# Patient Record
Sex: FEMALE | Race: WHITE | NOT HISPANIC OR LATINO | Employment: FULL TIME | URBAN - METROPOLITAN AREA
[De-identification: names, ages, dates, MRNs, and addresses within clinical notes are randomized per-mention and may not be internally consistent; named-entity substitution may affect disease eponyms.]

---

## 2023-06-05 LAB
ERYTHROCYTE DISTRIBUTION WIDTH (RATIO) BY AUTOMATED COUNT: 19.9 % (ref 11.5–14.5)
ERYTHROCYTE MEAN CORPUSCULAR HEMOGLOBIN CONCENTRATION (G/DL) BY AUTOMATED: 25.7 G/DL (ref 32–36)
ERYTHROCYTE MEAN CORPUSCULAR VOLUME (FL) BY AUTOMATED COUNT: 66 FL (ref 80–100)
ERYTHROCYTES (10*6/UL) IN BLOOD BY AUTOMATED COUNT: 4.42 X10E12/L (ref 4–5.2)
ESTIMATED AVERAGE GLUCOSE FOR HBA1C: 111 MG/DL
FERRITIN, PREGNANCY: 9 UG/L
HEMATOCRIT (%) IN BLOOD BY AUTOMATED COUNT: 29.2 % (ref 36–46)
HEMOGLOBIN (G/DL) IN BLOOD: 7.5 G/DL (ref 12–16)
HEMOGLOBIN A1C/HEMOGLOBIN TOTAL IN BLOOD: 5.5 %
IRON (UG/DL) IN SER/PLAS IN PREGNANCY: 17 UG/DL
IRON BINDING CAPACITY (UG/DL) IN PREGNANCY: 414 UG/DL
IRON SATURATION (%) IN PREGNANCY: 4 %
LEUKOCYTES (10*3/UL) IN BLOOD BY AUTOMATED COUNT: 6.4 X10E9/L (ref 4.4–11.3)
PLATELETS (10*3/UL) IN BLOOD AUTOMATED COUNT: 347 X10E9/L (ref 150–450)
REFLEX ADDED, ANEMIA PANEL: ABNORMAL

## 2023-07-12 LAB
ERYTHROCYTE DISTRIBUTION WIDTH (RATIO) BY AUTOMATED COUNT: 20 % (ref 11.5–14.5)
ERYTHROCYTE MEAN CORPUSCULAR HEMOGLOBIN CONCENTRATION (G/DL) BY AUTOMATED: 26.2 G/DL (ref 32–36)
ERYTHROCYTE MEAN CORPUSCULAR VOLUME (FL) BY AUTOMATED COUNT: 68 FL (ref 80–100)
ERYTHROCYTES (10*6/UL) IN BLOOD BY AUTOMATED COUNT: 4.43 X10E12/L (ref 4–5.2)
HEMATOCRIT (%) IN BLOOD BY AUTOMATED COUNT: 30.2 % (ref 36–46)
HEMOGLOBIN (G/DL) IN BLOOD: 7.9 G/DL (ref 12–16)
LEUKOCYTES (10*3/UL) IN BLOOD BY AUTOMATED COUNT: 7.7 X10E9/L (ref 4.4–11.3)
PLATELETS (10*3/UL) IN BLOOD AUTOMATED COUNT: 346 X10E9/L (ref 150–450)

## 2023-07-16 LAB — URINE CULTURE: NORMAL

## 2023-07-22 LAB
ABO GROUP (TYPE) IN BLOOD: NORMAL
ANION GAP IN SER/PLAS: 10 MMOL/L (ref 10–20)
ANTIBODY SCREEN: NORMAL
CALCIUM (MG/DL) IN SER/PLAS: 10 MG/DL (ref 8.6–10.3)
CARBON DIOXIDE, TOTAL (MMOL/L) IN SER/PLAS: 24 MMOL/L (ref 21–32)
CHLORIDE (MMOL/L) IN SER/PLAS: 105 MMOL/L (ref 98–107)
CHORIOGONADOTROPIN (MIU/ML) IN SER/PLAS: <2 MIU/ML
CREATININE (MG/DL) IN SER/PLAS: 0.73 MG/DL (ref 0.5–1.05)
ERYTHROCYTE DISTRIBUTION WIDTH (RATIO) BY AUTOMATED COUNT: 20.7 % (ref 11.5–14.5)
ERYTHROCYTE MEAN CORPUSCULAR HEMOGLOBIN CONCENTRATION (G/DL) BY AUTOMATED: 26.5 G/DL (ref 32–36)
ERYTHROCYTE MEAN CORPUSCULAR VOLUME (FL) BY AUTOMATED COUNT: 69 FL (ref 80–100)
ERYTHROCYTES (10*6/UL) IN BLOOD BY AUTOMATED COUNT: 4.59 X10E12/L (ref 4–5.2)
GFR FEMALE: >90 ML/MIN/1.73M2
GLUCOSE (MG/DL) IN SER/PLAS: 105 MG/DL (ref 74–99)
HEMATOCRIT (%) IN BLOOD BY AUTOMATED COUNT: 31.7 % (ref 36–46)
HEMOGLOBIN (G/DL) IN BLOOD: 8.4 G/DL (ref 12–16)
LEUKOCYTES (10*3/UL) IN BLOOD BY AUTOMATED COUNT: 6.9 X10E9/L (ref 4.4–11.3)
PLATELETS (10*3/UL) IN BLOOD AUTOMATED COUNT: 426 X10E9/L (ref 150–450)
POTASSIUM (MMOL/L) IN SER/PLAS: 3.8 MMOL/L (ref 3.5–5.3)
RH FACTOR: NORMAL
SODIUM (MMOL/L) IN SER/PLAS: 135 MMOL/L (ref 136–145)
UREA NITROGEN (MG/DL) IN SER/PLAS: 18 MG/DL (ref 6–23)

## 2023-07-23 LAB — URINE CULTURE: NORMAL

## 2023-07-25 ENCOUNTER — HOSPITAL ENCOUNTER (OUTPATIENT)
Dept: DATA CONVERSION | Facility: HOSPITAL | Age: 48
End: 2023-07-25
Attending: OBSTETRICS & GYNECOLOGY | Admitting: OBSTETRICS & GYNECOLOGY

## 2023-07-25 DIAGNOSIS — D25.9 LEIOMYOMA OF UTERUS, UNSPECIFIED: ICD-10-CM

## 2023-07-25 DIAGNOSIS — N93.9 ABNORMAL UTERINE AND VAGINAL BLEEDING, UNSPECIFIED: ICD-10-CM

## 2023-07-25 DIAGNOSIS — D64.9 ANEMIA, UNSPECIFIED: ICD-10-CM

## 2023-07-25 DIAGNOSIS — N84.1 POLYP OF CERVIX UTERI: ICD-10-CM

## 2023-07-25 LAB
ABO GROUP (TYPE) IN BLOOD: NORMAL
RH FACTOR: NORMAL

## 2023-08-09 LAB — URINE CULTURE: NORMAL

## 2023-08-17 LAB
COMPLETE PATHOLOGY REPORT: NORMAL
CONVERTED CLINICAL DIAGNOSIS-HISTORY: NORMAL
CONVERTED FINAL DIAGNOSIS: NORMAL
CONVERTED FINAL REPORT PDF LINK TO COPY AND PASTE: NORMAL
CONVERTED GROSS DESCRIPTION: NORMAL

## 2023-09-29 VITALS
SYSTOLIC BLOOD PRESSURE: 130 MMHG | HEART RATE: 66 BPM | TEMPERATURE: 99.9 F | DIASTOLIC BLOOD PRESSURE: 70 MMHG | RESPIRATION RATE: 16 BRPM

## 2023-09-30 NOTE — H&P
History of Present Illness:   Pregnant/Lactating:  ·  Are You Pregnant no   ·  Are You Currently Breastfeeding no     History Present Illness:  Reason for surgery: AUB, fibroids, anemia   HPI:    AUB, fibroids, anemia    Allergies:        Allergies:  ·  No Known Allergies :     Home Medication Review:   Home Medications Reviewed: yes     Impression/Procedure:   ·  Impression and Planned Procedure: Total laparoscopic hysterectomy with bilateral salpingectomy and cystoscopy for AUB, fibroids, anemia       ERAS (Enhanced Recovery After Surgery):  ·  ERAS Patient: yes   ·  CPM/PAT Utilization: yes   ·  Immunonutrition Recovery Drink Utilization: yes   ·  Carbohydrate Supplement Drink Utilization: yes       Vital Signs:  Temperature C: 37.7 degrees C   Temperature F: 99.8 degrees F   Heart Rate: 66 beats per minute   Respiratory Rate: 16 breath per minute   Blood Pressure Systolic: 130 mm/Hg   Blood Pressure Diastolic: 70 mm/Hg     Physical Exam by System:    Constitutional: Well developed, awake/alert/oriented  x3, no distress, alert and cooperative   Eyes: PERRL, EOMI, clear sclera   ENMT: mucous membranes moist, no apparent injury,  no lesions seen   Head/Neck: Neck supple, no apparent injury, thyroid  without mass or tenderness, No JVD, trachea midline, no bruits   Respiratory/Thorax: Patent airways, CTAB, normal  breath sounds with good chest expansion, thorax symmetric   Cardiovascular: Regular, rate and rhythm, no murmurs,  2+ equal pulses of the extremities, normal S 1and S 2   Gastrointestinal: Nondistended, soft, non-tender,  no rebound tenderness or guarding, no masses palpable, no organomegaly, +BS, no bruits   Genitourinary: No Discharge, vesicles or other abnormalities   Musculoskeletal: ROM intact, no joint swelling, normal  strength   Extremities: normal extremities, no cyanosis edema,  contusions or wounds, no clubbing   Neurological: alert and oriented x3, intact senses,  motor, response and  reflexes, normal strength   Lymphatic: No significant lymphadenopathy   Psychological: Appropriate mood and behavior   Skin: Warm and dry, no lesions, no rashes     Consent:   COVID-19 Consent:  ·  COVID-19 Risk Consent Surgeon has reviewed key risks related to the risk of roma COVID-19 and if they contract COVID-19 what the risks are.       Electronic Signatures:  Parker Glover ()  (Signed 25-Jul-2023 07:18)   Authored: History of Present Illness, Allergies, Home  Medication Review, Impression/Procedure, ERAS, Physical Exam, Consent, Note Completion      Last Updated: 25-Jul-2023 07:18 by Parker Glover (DO)

## 2023-10-02 NOTE — OP NOTE
PROCEDURE DETAILS    Preoperative Diagnosis:  Abnormal uterine bleeding, N93.9  Fibroids  Anemia    Postoperative Diagnosis:  Abnormal uterine bleeding, N93.9  Fibroids  Anemia    Surgeon: Parker Glover  Resident/Fellow/Other Assistant: Karly Bassett    Procedure:  1. TOTAL LAPAROSCOPIC HYSTERECTOMY, BILATERAL SALPINGECTOMY, CYSTOSCOPY    Anesthesia: Juan Ramon Maldonado  Estimated Blood Loss: 75 mL  Findings: 18-week size uterus with numerous large fibroids, normal bilateral fallopian tubes and ovaries, normal cystoscopy  Specimens(s) Collected: yes,  Uterus with cervix and bilateral fallopian tubes (morcellated)   Complications: none        Operative Report:   DETAILS:   Patient presents for definitive hysterectomy having failed/declined medical management.  Risks, benefits, and alternatives discussed with the patient.   Risks include bleeding (needing transfusion with potential side effects of transfusion reaction or infection or antibodies), infection (superficial or deep spaces), injury to other organs/tissues (ureters, bladder, vessels, abdominal wall, or bowel)  diagnosed now or in the future, scar tissue, lack of resolution of pain symptoms (if applicable).  Discussed that all of these risks could potentially require further surgery now or in the future, as well as prolonged hospitalization. Patient desired  to proceed.     PROCEDURE:  After informed consent the patient was brought to the operating room. The patient was intubated without complication after general anesthesia was started. She is placed in lithotomy position in yellowfin stirrups. An exam under anesthesia was performed.  She was prepped draped in the normal sterile fashion with ChloraPrep and Betadine. Timeout was performed. She was given preoperative antibiotics. Landrum was introduced into the bladder sterilely with with production of clear urine. A speculum was placed  in the vagina and anterior lip of the cervix grasped with a  single-tooth tenaculum. The cervix was injected with marcaine epinephrine and vasopressin intracervically. The uterus was sounded and dilated and a AKIN uterine manipulator was placed in the  uterine cavity. Attention was then paid to the abdomen.    Marcaine was injected at the umbilicus and a scalpel incision was made midline to 15 mm down to the underlying fascia which was opened sharply, the peritoneal cavity was opened bluntly. A single site gel port was placed. The peritoneum was insufflated.   A second 5 mm disposable trocar was also placed in the LLQ under direct laparoscopic visualization.   The abdomen was surveyed.  Attention was turned to the salpingectomy portion of the procedure, the broad ligament was sealed and transected inferior  to the tubes starting at the ovary being careful to avoid the IP ligament in this process, and then the  partially transected fallopian tubes were left still attached to the uterus with removal planned vaginally with the hysterectomy specimen. The round  ligament to the utero-ovarian ligament broad ligament and uterine artery were sealed and divided away bilaterally. The bladder was identified. Bladder flap was created. The colpotomy was then performed with the monopolar tip anteriorly and came around  circumferentially.   Colpotomy was able to be completed and the uterus with cervix and the bilateral tubes were left in site in the upper abdomen for planned later removal via the umbilical port via contained morcellation process.  Pneumo-occluder was  then placed in the vagina.  Suction and irrigation were performed to allow to the edges of the vagina which was then closed laparoscopically in a horizontal running fashion using the V-lock suture.      Due to the large size of the uterus due to the fibroids the specimen would not able to be removed via the vagina, therefore the specimen was temporarily placed in the upper abdomen.  A large specimen bag was placed in the  abdominal cavity and then the  specimen was grasped and placed into the bag.  The bag was brought up to the level of the umbilicus.  The abdominal incision was extended an additional centimeter.  The bag was then brought through the umbilical incision.  The GelPort retractor was placed  into the bag to aid in visualization and to widen the incision for the planned hand morcellation.  Extracorporeal morcellation was then performed sharply until the entire specimen was removed.  This process involved an additional 60 minutes of morcellation  time due to the size of the uterus and volume and number or uterine fibroids.   Once the morcellation was completed the specimen bag was removed and was inspected and was intact.  The GelPort was then replaced into the abdomen pneumoperitoneum was reintroduced.    Tisseal was placed on the cuff laparoscopically.  Hemostasis was noted and all surfaces were well visualized, all irrigation fluid was removed from the peritoneum. The diaphragm was irrigated with a dilute lidocaine and bicarb solution and left in place.  The laparoscopic ports were then removed and the incisions were hemostatic.  The rectus fascia at the umbilicus was then closed. Subcutaneous tissue was irrigated.  The skin was then closed and then on the 2 ports skin glue was placed over all of the  incisions. The pneumo-occluder was removed from the vagina, vaginoscopy done, blood clots were then removed excellent cuff closure was noted.    The hess catheter was then removed.  Cystoscopy was then performed identifying ureteral orifices bilaterally and no intrusion or distortion upon  the bladder. Bilateral reflux from the ureter orifices was noted. Procedure was then ended and all counts are reported as correct x 2. The patient was transferred to PACU in stable condition.                        Attestation:   Note Completion:  Attending Attestation I performed the procedure without a resident         Electronic  Signatures:  Parker Glover ()  (Signed 25-Jul-2023 13:16)   Authored: Post-Operative Note, Chart Review, Note Completion      Last Updated: 25-Jul-2023 13:16 by Parker Glover ()

## 2024-04-22 ENCOUNTER — TELEPHONE (OUTPATIENT)
Dept: PRIMARY CARE | Facility: CLINIC | Age: 49
End: 2024-04-22

## 2024-04-22 ENCOUNTER — OFFICE VISIT (OUTPATIENT)
Dept: PRIMARY CARE | Facility: CLINIC | Age: 49
End: 2024-04-22
Payer: COMMERCIAL

## 2024-04-22 VITALS
WEIGHT: 155 LBS | DIASTOLIC BLOOD PRESSURE: 90 MMHG | OXYGEN SATURATION: 98 % | SYSTOLIC BLOOD PRESSURE: 133 MMHG | BODY MASS INDEX: 29.29 KG/M2 | HEART RATE: 62 BPM

## 2024-04-22 DIAGNOSIS — R20.2 PARESTHESIAS: ICD-10-CM

## 2024-04-22 DIAGNOSIS — I10 HYPERTENSION, UNSPECIFIED TYPE: Primary | ICD-10-CM

## 2024-04-22 PROBLEM — N39.3 STRESS INCONTINENCE OF URINE: Status: ACTIVE | Noted: 2024-04-22

## 2024-04-22 PROBLEM — D25.2 INTRAMURAL AND SUBSEROUS LEIOMYOMA OF UTERUS: Status: ACTIVE | Noted: 2024-04-22

## 2024-04-22 PROBLEM — D25.1 INTRAMURAL AND SUBSEROUS LEIOMYOMA OF UTERUS: Status: ACTIVE | Noted: 2024-04-22

## 2024-04-22 PROBLEM — N94.9 ADNEXAL CYST: Status: ACTIVE | Noted: 2024-04-22

## 2024-04-22 PROBLEM — M79.7 FIBROMYALGIA: Status: ACTIVE | Noted: 2024-04-22

## 2024-04-22 PROBLEM — R87.610 ASCUS OF CERVIX WITH NEGATIVE HIGH RISK HPV: Status: ACTIVE | Noted: 2024-04-22

## 2024-04-22 PROBLEM — N93.9 ABNORMAL UTERINE BLEEDING (AUB): Status: ACTIVE | Noted: 2024-04-22

## 2024-04-22 PROBLEM — D50.9 IRON DEFICIENCY ANEMIA: Status: ACTIVE | Noted: 2024-04-22

## 2024-04-22 PROCEDURE — 1036F TOBACCO NON-USER: CPT | Performed by: FAMILY MEDICINE

## 2024-04-22 PROCEDURE — 3080F DIAST BP >= 90 MM HG: CPT | Performed by: FAMILY MEDICINE

## 2024-04-22 PROCEDURE — 99214 OFFICE O/P EST MOD 30 MIN: CPT | Performed by: FAMILY MEDICINE

## 2024-04-22 PROCEDURE — 3075F SYST BP GE 130 - 139MM HG: CPT | Performed by: FAMILY MEDICINE

## 2024-04-22 RX ORDER — AMLODIPINE BESYLATE 5 MG/1
5 TABLET ORAL DAILY
Qty: 30 TABLET | Refills: 5 | Status: SHIPPED | OUTPATIENT
Start: 2024-04-22 | End: 2024-10-19

## 2024-04-22 RX ORDER — VALACYCLOVIR HYDROCHLORIDE 1 G/1
1000 TABLET, FILM COATED ORAL 3 TIMES DAILY
Qty: 21 TABLET | Refills: 0 | Status: SHIPPED | OUTPATIENT
Start: 2024-04-22 | End: 2024-04-29

## 2024-04-22 ASSESSMENT — ENCOUNTER SYMPTOMS
FEVER: 0
SHORTNESS OF BREATH: 0
ABDOMINAL PAIN: 0

## 2024-04-22 NOTE — TELEPHONE ENCOUNTER
Patient states that she might be having a shingles outbreak. There is no rash as of yet but she has that feeling.  (She has had them in the past)  She would just like to get ahead of this and is off work today and would like to come in      Please  Advise

## 2024-04-22 NOTE — LETTER
April 22, 2024     Patient: Christy Dimas   YOB: 1975   Date of Visit: 4/22/2024       To Whom It May Concern:    Christy Dimas was seen in my clinic on 4/22/2024 at ?. Please excuse Christy for her absence from work on this day to make the appointment.    If you have any questions or concerns, please don't hesitate to call.         Sincerely,       Sebas Choi D.O.

## 2024-04-22 NOTE — PROGRESS NOTES
Subjective   Patient ID: Christy Dimas is a 48 y.o. female who presents for Other (Pain to left side 10/10).    HPI     She is here today with concerns for shingles  She has had pain which starts in her left back and radiates across to her left abdomen present for 2 days.  She describes this as burning and tingling.  She has not noticed a rash yet, but states that she did have identical symptoms approximately 10 to 15 years ago when she had shingles (she did later on developed a rash at that time  No abdominal pain, chest pain  She has a history of hypertension which is currently untreated.  Checks blood pressure out of the office and it is often in the 120s to 130s.  She has been doing very well with diet and exercise and has lost weight  She did undergo a hysterectomy this past summer for fibroids      Review of Systems   Constitutional:  Negative for fever.   Respiratory:  Negative for shortness of breath.    Cardiovascular:  Negative for chest pain.   Gastrointestinal:  Negative for abdominal pain.   Skin:  Negative for rash.       Objective   /90   Pulse 62   Wt 70.3 kg (155 lb)   SpO2 98%   BMI 29.29 kg/m²     Physical Exam  Vitals reviewed.   Constitutional:       General: She is not in acute distress.     Appearance: Normal appearance. She is well-developed.   HENT:      Head: Normocephalic.   Eyes:      Conjunctiva/sclera: Conjunctivae normal.   Cardiovascular:      Rate and Rhythm: Normal rate and regular rhythm.      Heart sounds: Normal heart sounds.   Pulmonary:      Effort: Pulmonary effort is normal.      Breath sounds: Normal breath sounds.   Abdominal:      Palpations: Abdomen is soft.      Tenderness: There is no abdominal tenderness.   Skin:     Findings: No rash.      Comments: She points to the left T10/T11 dermatomal area as to where she has noticed her symptoms.  I examined the area closely and did not see any papules, vesicles, or any rash present   Neurological:      Mental Status:  She is alert.   Psychiatric:         Mood and Affect: Mood normal.         Behavior: Behavior normal.         Assessment/Plan   Problem List Items Addressed This Visit          Medium    Hypertension - Primary    Relevant Medications    amLODIPine (Norvasc) 5 mg tablet    Other Relevant Orders    CBC    Comprehensive Metabolic Panel    Lipid Panel    TSH with reflex to Free T4 if abnormal    Iron and TIBC     Other Visit Diagnoses       Paresthesias        Relevant Medications    valACYclovir (Valtrex) 1 gram tablet    Other Relevant Orders    CBC    Comprehensive Metabolic Panel    Lipid Panel    TSH with reflex to Free T4 if abnormal    Iron and TIBC        #1 hypertension: Her blood pressure was initially 133/90.  This was elevated at 151/89 on recheck.  She has a history of hypertension which is currently untreated, but was previously treated with amlodipine.  She is agreeable to restarting amlodipine.  Will restart amlodipine at 5 mg daily.  Continue to work on healthy diet and exercise.  Continue to monitor blood pressure out of the office and follow-up in 3 months for recheck and CPE  Given order for labs to get checked prior to follow-up  2.  Possible shingles: She reports a 2-day history of burning and sensitivity involving her left back and abdomen and a T10/T11 dermatomal distribution, with very similar symptoms in the past due to shingles.  She is concerned about this being another outbreak of shingles and would like to get started on treatment.  Will start her on Valtrex 1000 mg 3 times daily x 7 days.  We did discuss transmission precautions and keeping the area covered.  If she does end up developing a rash which does not resolve with valacyclovir, recommended scheduling a follow-up.  We did discuss that she would be eligible to receive the shingles vaccine when she does turn 49.  Will discuss this at follow-up  Follow-up in 3 months for recheck and CPE

## 2024-07-24 ENCOUNTER — APPOINTMENT (OUTPATIENT)
Dept: PRIMARY CARE | Facility: CLINIC | Age: 49
End: 2024-07-24
Payer: COMMERCIAL

## 2024-07-24 VITALS
HEART RATE: 63 BPM | SYSTOLIC BLOOD PRESSURE: 121 MMHG | BODY MASS INDEX: 29.07 KG/M2 | DIASTOLIC BLOOD PRESSURE: 87 MMHG | OXYGEN SATURATION: 95 % | WEIGHT: 154 LBS | TEMPERATURE: 97 F | HEIGHT: 61 IN

## 2024-07-24 DIAGNOSIS — I10 HYPERTENSION, UNSPECIFIED TYPE: ICD-10-CM

## 2024-07-24 DIAGNOSIS — M41.9 SCOLIOSIS, UNSPECIFIED SCOLIOSIS TYPE, UNSPECIFIED SPINAL REGION: ICD-10-CM

## 2024-07-24 DIAGNOSIS — Z12.11 SCREENING FOR COLON CANCER: Primary | ICD-10-CM

## 2024-07-24 DIAGNOSIS — Z00.00 ROUTINE HEALTH MAINTENANCE: ICD-10-CM

## 2024-07-24 DIAGNOSIS — M21.70 LEG LENGTH DISCREPANCY: ICD-10-CM

## 2024-07-24 DIAGNOSIS — Z12.31 BREAST CANCER SCREENING BY MAMMOGRAM: ICD-10-CM

## 2024-07-24 PROCEDURE — 90471 IMMUNIZATION ADMIN: CPT | Performed by: FAMILY MEDICINE

## 2024-07-24 PROCEDURE — 99396 PREV VISIT EST AGE 40-64: CPT | Performed by: FAMILY MEDICINE

## 2024-07-24 PROCEDURE — 3074F SYST BP LT 130 MM HG: CPT | Performed by: FAMILY MEDICINE

## 2024-07-24 PROCEDURE — 1036F TOBACCO NON-USER: CPT | Performed by: FAMILY MEDICINE

## 2024-07-24 PROCEDURE — 90750 HZV VACC RECOMBINANT IM: CPT | Performed by: FAMILY MEDICINE

## 2024-07-24 PROCEDURE — 3079F DIAST BP 80-89 MM HG: CPT | Performed by: FAMILY MEDICINE

## 2024-07-24 PROCEDURE — 3008F BODY MASS INDEX DOCD: CPT | Performed by: FAMILY MEDICINE

## 2024-07-24 RX ORDER — AMLODIPINE BESYLATE 5 MG/1
5 TABLET ORAL DAILY
Qty: 90 TABLET | Refills: 3 | Status: SHIPPED | OUTPATIENT
Start: 2024-07-24

## 2024-07-24 SDOH — ECONOMIC STABILITY: TRANSPORTATION INSECURITY
IN THE PAST 12 MONTHS, HAS THE LACK OF TRANSPORTATION KEPT YOU FROM MEDICAL APPOINTMENTS OR FROM GETTING MEDICATIONS?: NO

## 2024-07-24 SDOH — HEALTH STABILITY: PHYSICAL HEALTH: ON AVERAGE, HOW MANY MINUTES DO YOU ENGAGE IN EXERCISE AT THIS LEVEL?: 60 MIN

## 2024-07-24 SDOH — ECONOMIC STABILITY: TRANSPORTATION INSECURITY
IN THE PAST 12 MONTHS, HAS LACK OF TRANSPORTATION KEPT YOU FROM MEETINGS, WORK, OR FROM GETTING THINGS NEEDED FOR DAILY LIVING?: NO

## 2024-07-24 SDOH — HEALTH STABILITY: PHYSICAL HEALTH: ON AVERAGE, HOW MANY DAYS PER WEEK DO YOU ENGAGE IN MODERATE TO STRENUOUS EXERCISE (LIKE A BRISK WALK)?: 5 DAYS

## 2024-07-24 ASSESSMENT — ENCOUNTER SYMPTOMS
WHEEZING: 0
HEADACHES: 0
DIZZINESS: 0
BACK PAIN: 1
FEVER: 0
COUGH: 0

## 2024-07-24 ASSESSMENT — PATIENT HEALTH QUESTIONNAIRE - PHQ9
2. FEELING DOWN, DEPRESSED OR HOPELESS: NOT AT ALL
1. LITTLE INTEREST OR PLEASURE IN DOING THINGS: NOT AT ALL
SUM OF ALL RESPONSES TO PHQ9 QUESTIONS 1 & 2: 0

## 2024-07-24 ASSESSMENT — SOCIAL DETERMINANTS OF HEALTH (SDOH): HOW HARD IS IT FOR YOU TO PAY FOR THE VERY BASICS LIKE FOOD, HOUSING, MEDICAL CARE, AND HEATING?: NOT HARD AT ALL

## 2024-07-24 NOTE — PROGRESS NOTES
"Subjective   Patient ID: Christy Dimas is a 49 y.o. female who presents for Annual Exam.    HPI     Here today for annual physical  She is currently taking amlodipine 5 mg daily for hypertension.  His medication was started at last visit in April.  Her blood pressure at that time was 133/90  Compliant with taking no adverse effects.  No elevated BP readings out of office  She has a difficulty losing weight.  She bought an exercise bike and has been using this for exercise, and also has been doing well with diet  Last mammogram was done July 2022.  This is category 1.  Has not been screened for colon cancer in the past  She does have a history of scoliosis and has pain which can be severe.  This is most noticeable in her lower back.  She previously did do physical therapy.  She has noticed that her right leg is shorter than the left and feels that she would benefit from a heel lift.  She has tried the over-the-counter heel lifts which have not helped      Review of Systems   Constitutional:  Negative for fever.   Respiratory:  Negative for cough and wheezing.    Cardiovascular:  Negative for chest pain and leg swelling.   Musculoskeletal:  Positive for back pain.   Neurological:  Negative for dizziness and headaches.       Objective   /87   Pulse 63   Temp 36.1 °C (97 °F) (Temporal)   Ht 1.549 m (5' 1\")   Wt 69.9 kg (154 lb)   LMP 05/01/2023   SpO2 95%   BMI 29.10 kg/m²     Physical Exam  Vitals reviewed.   Constitutional:       General: She is not in acute distress.     Appearance: Normal appearance. She is well-developed.   HENT:      Head: Normocephalic.      Right Ear: Tympanic membrane, ear canal and external ear normal.      Left Ear: Tympanic membrane, ear canal and external ear normal.      Nose: Nose normal.      Mouth/Throat:      Mouth: Mucous membranes are moist.   Eyes:      Conjunctiva/sclera: Conjunctivae normal.   Neck:      Thyroid: No thyromegaly.      Vascular: No JVD.   Cardiovascular: "      Rate and Rhythm: Normal rate and regular rhythm.      Heart sounds: Normal heart sounds.   Pulmonary:      Effort: Pulmonary effort is normal.      Breath sounds: Normal breath sounds.   Musculoskeletal:      Right lower leg: No edema.      Left lower leg: No edema.      Comments: Right sided scoliotic curve involving the thoracic region of spine   Lymphadenopathy:      Cervical: No cervical adenopathy.   Skin:     Findings: No rash.   Neurological:      Mental Status: She is alert and oriented to person, place, and time.   Psychiatric:         Mood and Affect: Mood normal.         Behavior: Behavior normal.         Assessment/Plan   Assessment & Plan  Routine health maintenance         Hypertension, unspecified type    Orders:    amLODIPine (Norvasc) 5 mg tablet; Take 1 tablet (5 mg) by mouth once daily.    Screening for colon cancer    Orders:    Cologuard® colon cancer screening; Future    Cologuard® colon cancer screening    Breast cancer screening by mammogram    Orders:    BI mammo bilateral screening tomosynthesis; Future    Scoliosis, unspecified scoliosis type, unspecified spinal region    Orders:    XR scoliosis 1 view; Future    Leg length discrepancy    Orders:    Referral to Podiatry; Future    Continue to work on healthy diet and regular exercise  Pap testing not needed due to hysterectomy  Ordered mammogram for breast cancer screening  She would prefer Cologuard for colon cancer screening.  This was ordered today  Hypertension: Her blood pressure has improved and is at goal at 121/87.  Continue amlodipine at current dose  Labs including screening lipid panel and glucose ordered at last visit.  Recommended getting these done  We will obtain a follow-up x-ray for her scoliosis.  She declines referral to orthopedics or PT at this time.  Will also refer her to podiatry to discuss a custom heel lift for leg length discrepancy  Otherwise doing well, can follow-up in 1 year for next CPE  Declines Tdap  vaccine today.  Given first shingles vaccine.  Follow-up in 2 to 6 months for nurse visit for second shingles vaccine

## 2024-08-06 LAB — NONINV COLON CA DNA+OCC BLD SCRN STL QL: NEGATIVE

## 2024-08-08 ENCOUNTER — APPOINTMENT (OUTPATIENT)
Dept: RADIOLOGY | Facility: CLINIC | Age: 49
End: 2024-08-08
Payer: COMMERCIAL

## 2024-09-24 ENCOUNTER — APPOINTMENT (OUTPATIENT)
Dept: PRIMARY CARE | Facility: CLINIC | Age: 49
End: 2024-09-24
Payer: COMMERCIAL

## 2024-09-24 ENCOUNTER — LAB (OUTPATIENT)
Dept: LAB | Facility: LAB | Age: 49
End: 2024-09-24
Payer: COMMERCIAL

## 2024-09-24 DIAGNOSIS — Z00.00 ROUTINE HEALTH MAINTENANCE: ICD-10-CM

## 2024-09-24 DIAGNOSIS — R20.2 PARESTHESIAS: ICD-10-CM

## 2024-09-24 DIAGNOSIS — I10 HYPERTENSION, UNSPECIFIED TYPE: ICD-10-CM

## 2024-09-24 LAB
ALBUMIN SERPL BCP-MCNC: 4.3 G/DL (ref 3.4–5)
ALP SERPL-CCNC: 32 U/L (ref 33–110)
ALT SERPL W P-5'-P-CCNC: 12 U/L (ref 7–45)
ANION GAP SERPL CALC-SCNC: 9 MMOL/L (ref 10–20)
AST SERPL W P-5'-P-CCNC: 9 U/L (ref 9–39)
BILIRUB SERPL-MCNC: 0.4 MG/DL (ref 0–1.2)
BUN SERPL-MCNC: 21 MG/DL (ref 6–23)
CALCIUM SERPL-MCNC: 10.1 MG/DL (ref 8.6–10.3)
CHLORIDE SERPL-SCNC: 106 MMOL/L (ref 98–107)
CHOLEST SERPL-MCNC: 176 MG/DL (ref 0–199)
CHOLESTEROL/HDL RATIO: 2.7
CO2 SERPL-SCNC: 29 MMOL/L (ref 21–32)
CREAT SERPL-MCNC: 0.64 MG/DL (ref 0.5–1.05)
EGFRCR SERPLBLD CKD-EPI 2021: >90 ML/MIN/1.73M*2
ERYTHROCYTE [DISTWIDTH] IN BLOOD BY AUTOMATED COUNT: 15.1 % (ref 11.5–14.5)
GLUCOSE SERPL-MCNC: 75 MG/DL (ref 74–99)
HCT VFR BLD AUTO: 43.2 % (ref 36–46)
HDLC SERPL-MCNC: 64.1 MG/DL
HGB BLD-MCNC: 13.7 G/DL (ref 12–16)
IRON SATN MFR SERPL: 26 % (ref 25–45)
IRON SERPL-MCNC: 84 UG/DL (ref 35–150)
LDLC SERPL CALC-MCNC: 100 MG/DL
MCH RBC QN AUTO: 27.1 PG (ref 26–34)
MCHC RBC AUTO-ENTMCNC: 31.7 G/DL (ref 32–36)
MCV RBC AUTO: 85 FL (ref 80–100)
NON HDL CHOLESTEROL: 112 MG/DL (ref 0–149)
NRBC BLD-RTO: 0 /100 WBCS (ref 0–0)
PLATELET # BLD AUTO: 241 X10*3/UL (ref 150–450)
POTASSIUM SERPL-SCNC: 4.2 MMOL/L (ref 3.5–5.3)
PROT SERPL-MCNC: 6.7 G/DL (ref 6.4–8.2)
RBC # BLD AUTO: 5.06 X10*6/UL (ref 4–5.2)
SODIUM SERPL-SCNC: 140 MMOL/L (ref 136–145)
TIBC SERPL-MCNC: 323 UG/DL (ref 240–445)
TRIGL SERPL-MCNC: 61 MG/DL (ref 0–149)
TSH SERPL-ACNC: 1.37 MIU/L (ref 0.44–3.98)
UIBC SERPL-MCNC: 239 UG/DL (ref 110–370)
VLDL: 12 MG/DL (ref 0–40)
WBC # BLD AUTO: 6.2 X10*3/UL (ref 4.4–11.3)

## 2024-09-24 PROCEDURE — 90750 HZV VACC RECOMBINANT IM: CPT | Performed by: FAMILY MEDICINE

## 2024-09-24 PROCEDURE — 36415 COLL VENOUS BLD VENIPUNCTURE: CPT

## 2024-09-24 PROCEDURE — 90471 IMMUNIZATION ADMIN: CPT | Performed by: FAMILY MEDICINE

## 2024-09-24 PROCEDURE — 84443 ASSAY THYROID STIM HORMONE: CPT

## 2024-09-24 PROCEDURE — 80053 COMPREHEN METABOLIC PANEL: CPT

## 2024-09-24 PROCEDURE — 85027 COMPLETE CBC AUTOMATED: CPT

## 2024-09-24 PROCEDURE — 83550 IRON BINDING TEST: CPT

## 2024-09-24 PROCEDURE — 83540 ASSAY OF IRON: CPT

## 2024-09-24 PROCEDURE — 80061 LIPID PANEL: CPT

## 2024-09-24 NOTE — PROGRESS NOTES
Subjective   Patient ID: Christy Dimas is a 49 y.o. female who presents for Immunizations (Pt here for second shingles injection. Tolerated well. ).    HPI     Review of Systems    Objective   LMP 05/01/2023     Physical Exam    Assessment/Plan   Assessment & Plan  Routine health maintenance    Orders:    Zoster vaccine, recombinant, adult (SHINGRIX)

## 2024-10-22 ENCOUNTER — APPOINTMENT (OUTPATIENT)
Dept: PODIATRY | Facility: CLINIC | Age: 49
End: 2024-10-22
Payer: COMMERCIAL

## 2024-11-12 ENCOUNTER — APPOINTMENT (OUTPATIENT)
Dept: PODIATRY | Facility: CLINIC | Age: 49
End: 2024-11-12
Payer: COMMERCIAL

## 2025-06-30 ENCOUNTER — APPOINTMENT (OUTPATIENT)
Dept: ORTHOPEDIC SURGERY | Facility: CLINIC | Age: 50
End: 2025-06-30
Payer: COMMERCIAL

## 2025-07-14 ENCOUNTER — APPOINTMENT (OUTPATIENT)
Dept: NEUROSURGERY | Facility: CLINIC | Age: 50
End: 2025-07-14
Payer: COMMERCIAL

## 2025-07-15 ENCOUNTER — APPOINTMENT (OUTPATIENT)
Dept: NEUROSURGERY | Facility: CLINIC | Age: 50
End: 2025-07-15
Payer: COMMERCIAL

## 2025-07-15 VITALS
HEIGHT: 61 IN | DIASTOLIC BLOOD PRESSURE: 84 MMHG | HEART RATE: 52 BPM | BODY MASS INDEX: 30.26 KG/M2 | TEMPERATURE: 97.2 F | SYSTOLIC BLOOD PRESSURE: 152 MMHG | WEIGHT: 160.3 LBS

## 2025-07-15 DIAGNOSIS — M54.42 CHRONIC BILATERAL LOW BACK PAIN WITH LEFT-SIDED SCIATICA: ICD-10-CM

## 2025-07-15 DIAGNOSIS — G89.29 CHRONIC BILATERAL LOW BACK PAIN WITH LEFT-SIDED SCIATICA: ICD-10-CM

## 2025-07-15 DIAGNOSIS — M41.85 OTHER FORM OF SCOLIOSIS OF THORACOLUMBAR SPINE: Primary | ICD-10-CM

## 2025-07-15 PROCEDURE — 99205 OFFICE O/P NEW HI 60 MIN: CPT | Performed by: PHYSICIAN ASSISTANT

## 2025-07-15 PROCEDURE — 1036F TOBACCO NON-USER: CPT | Performed by: PHYSICIAN ASSISTANT

## 2025-07-15 PROCEDURE — 3077F SYST BP >= 140 MM HG: CPT | Performed by: PHYSICIAN ASSISTANT

## 2025-07-15 PROCEDURE — 3008F BODY MASS INDEX DOCD: CPT | Performed by: PHYSICIAN ASSISTANT

## 2025-07-15 PROCEDURE — 3079F DIAST BP 80-89 MM HG: CPT | Performed by: PHYSICIAN ASSISTANT

## 2025-07-15 ASSESSMENT — PAIN SCALES - GENERAL: PAINLEVEL_OUTOF10: 9

## 2025-07-15 NOTE — PROGRESS NOTES
[facility] [place]  Department of Neurological Surgery  New Patient Visit    History of Present Illness:  Christy Dimsa is a 50 y.o. year old female who presents to the spine clinic with concerns of both longstanding scoliosis and increasing pain in addition to more recent worsening of left lower extremity sciatica.  Patient states that she was diagnosed as a young child with scoliosis however parents declined surgical intervention at that time.  In the meantime she has continued dealing with the axial back pain and more recent radicular pain worsened with standing as well as any activity including bending, twisting, and lifting.  It is improved mildly through use of bareback and body as well as laying down to rest.  Tylenol, Salonpas patches, Biofreeze, TENS unit, and inversion table have all been utilized as well with only minimal short-term benefit provided.  Currently she endorses weakness and difficulty stepping with her left leg secondary to pain.  Continues rated her pain on average 8/10 with increased to 10/10 especially at the end of the day.  She denies specific new bowel or bladder incontinence/retention.  X-rays from 2010 show leftward diversion and lower lumbar with correction lower to mid thoracic.    Prior Spine Surgeries: none    Physical Therapy: no  Diabetic:      Osteoporosis: no  Patient's BMI is Body mass index is 30.29 kg/m².    14/14 systems reviewed and negative other than what is listed in the history of present illness    Problem List[1]  Medical History[2]  Surgical History[3]  Social History     Tobacco Use    Smoking status: Never    Smokeless tobacco: Never   Substance Use Topics    Alcohol use: Never     family history is not on file.  Current Medications[4]  Allergies[5]    Physical Examination    General: Well developed, awake/alert/oriented x3, no distress, alert and cooperative  Skin: Warm and dry, no lesions, no rashes  ENMT: Mucous membranes moist, no apparent  injury, no lesions seen  Head/Neck: Neck Supple, no apparent injury  Respiratory/Thorax: Normal breath sounds with good chest expansion, thorax symmetric  Cardiovascular: No pitting edema, no JVD    Motor Strength: 4/5 left knee flexion, left hip extension, otherwise 5/5 Throughout all extremities    Muscle Bulk: Normal and symmetric in all extremities    Posture:   -- Cervical: Normal  -- Thoracic: Normal  -- Lumbar : Normal  Paraspinal muscle spasm/tenderness bilateral lower lumbar left slightly worse than right  Midline tenderness absent    Sensation: intact to light touch         Assessment and Plan:  Christy Dimas is a 50 y.o. year old female who presents to the spine clinic with concerns of both longstanding scoliosis and increasing pain in addition to more recent worsening of left lower extremity sciatica.  Patient states that she was diagnosed as a young child with scoliosis however parents declined surgical intervention at that time.  In the meantime she has continued dealing with the axial back pain and more recent radicular pain worsened with standing as well as any activity including bending, twisting, and lifting.  It is improved mildly through use of bareback and body as well as laying down to rest.  Tylenol, Salonpas patches, Biofreeze, TENS unit, and inversion table have all been utilized as well with only minimal short-term benefit provided.  Currently she endorses weakness and difficulty stepping with her left leg secondary to pain.  Continues rated her pain on average 8/10 with increased to 10/10 especially at the end of the day.  She denies specific new bowel or bladder incontinence/retention.  X-rays from 2010 show leftward diversion and lower lumbar with correction lower to mid thoracic.    Will continue workup for scoliosis including EOS x-rays as well as CT scan thoracic and lumbar and lumbar flexion-extension x-rays.  This all necessary to allow surgeon to plan for breast benefit and  alignment improvement.  Follow-up with surgeon to be scheduled after imaging complete.    For improvement of sciatica we will provide referral to physical therapy including Schroth methodology for scoliosis improvement.  Patient may benefit from pain management provider also for further discussion of nonoperative treatment options of possible epidural steroid injection.  MRI necessary for evaluation of soft tissue/neural evolvement leading to her unilateral pain and weakness.       I have reviewed all prior documentation and reviewed the electronic medical record since admission. I have personally have reviewed all advanced imaging not just the reports and used my interpretation as documented as the relevant findings. I have reviewed the risks and benefits of all treatment recommendations listed in this note with the patient and family.       The above clinical summary has been dictated with voice recognition software. It has not been proofread for grammatical errors, typographical mistakes, or other semantic inconsistencies.    Thank you for visiting our office today. It was our pleasure to take part in your healthcare.     Do not hesitate to call with any questions regarding your plan of care after leaving at [phone] M-F 8am-4pm.     To clinicians, thank you very much for this kind referral. It is a privilege to partner with you in the care of your patients. My office would be delighted to assist you with any further consultations or with questions regarding the plan of care outlined. Do not hesitate to call the office or contact me directly.       Sincerely,      SARAH Moran, PA-C  Associate Physician Assistant, Neurosurgery  Clinical   [employer]    [place] [address] [address] [place] [address]    Phone: [phone]  Fax: [phone]           [1]   Patient Active  Problem List  Diagnosis    Abnormal uterine bleeding (AUB)    Adnexal cyst    ASCUS of cervix with negative high risk HPV    Fibromyalgia    Hypertension    Intramural and subserous leiomyoma of uterus    Iron deficiency anemia    Stress incontinence of urine   [2]   Past Medical History:  Diagnosis Date    Intra-abdominal and pelvic swelling, mass and lump, unspecified site     Abdominal mass   [3]   Past Surgical History:  Procedure Laterality Date    HYSTERECTOMY      OTHER SURGICAL HISTORY  07/30/2019    Arm surgery   [4]   Current Outpatient Medications:     amLODIPine (Norvasc) 5 mg tablet, Take 1 tablet (5 mg) by mouth once daily., Disp: 90 tablet, Rfl: 3  [5] No Known Allergies

## 2025-07-17 ENCOUNTER — HOSPITAL ENCOUNTER (OUTPATIENT)
Dept: RADIOLOGY | Facility: HOSPITAL | Age: 50
Discharge: HOME | End: 2025-07-17
Payer: COMMERCIAL

## 2025-07-17 DIAGNOSIS — M41.85 OTHER FORM OF SCOLIOSIS OF THORACOLUMBAR SPINE: ICD-10-CM

## 2025-07-17 DIAGNOSIS — G89.29 CHRONIC BILATERAL LOW BACK PAIN WITH LEFT-SIDED SCIATICA: ICD-10-CM

## 2025-07-17 DIAGNOSIS — M41.9 SCOLIOSIS, UNSPECIFIED SCOLIOSIS TYPE, UNSPECIFIED SPINAL REGION: ICD-10-CM

## 2025-07-17 DIAGNOSIS — M54.42 CHRONIC BILATERAL LOW BACK PAIN WITH LEFT-SIDED SCIATICA: ICD-10-CM

## 2025-07-17 PROCEDURE — 72082 X-RAY EXAM ENTIRE SPI 2/3 VW: CPT

## 2025-07-17 PROCEDURE — 72050 X-RAY EXAM NECK SPINE 4/5VWS: CPT

## 2025-07-17 PROCEDURE — 72120 X-RAY BEND ONLY L-S SPINE: CPT

## 2025-07-28 ENCOUNTER — ANCILLARY PROCEDURE (OUTPATIENT)
Facility: HOSPITAL | Age: 50
End: 2025-07-28
Payer: COMMERCIAL

## 2025-07-28 ENCOUNTER — APPOINTMENT (OUTPATIENT)
Dept: RADIOLOGY | Facility: HOSPITAL | Age: 50
End: 2025-07-28
Payer: COMMERCIAL

## 2025-07-28 ENCOUNTER — APPOINTMENT (OUTPATIENT)
Facility: HOSPITAL | Age: 50
End: 2025-07-28
Payer: COMMERCIAL

## 2025-07-28 DIAGNOSIS — G89.29 CHRONIC BILATERAL LOW BACK PAIN WITH LEFT-SIDED SCIATICA: ICD-10-CM

## 2025-07-28 DIAGNOSIS — M41.85 OTHER FORM OF SCOLIOSIS OF THORACOLUMBAR SPINE: ICD-10-CM

## 2025-07-28 DIAGNOSIS — M54.42 CHRONIC BILATERAL LOW BACK PAIN WITH LEFT-SIDED SCIATICA: ICD-10-CM

## 2025-07-28 PROCEDURE — 72131 CT LUMBAR SPINE W/O DYE: CPT

## 2025-07-28 PROCEDURE — 72128 CT CHEST SPINE W/O DYE: CPT

## 2025-07-28 PROCEDURE — 72131 CT LUMBAR SPINE W/O DYE: CPT | Performed by: RADIOLOGY

## 2025-07-28 PROCEDURE — 72128 CT CHEST SPINE W/O DYE: CPT | Performed by: RADIOLOGY

## 2025-08-11 ENCOUNTER — INITIAL CONSULT (OUTPATIENT)
Age: 50
End: 2025-08-11
Payer: COMMERCIAL

## 2025-08-11 VITALS
SYSTOLIC BLOOD PRESSURE: 122 MMHG | HEART RATE: 55 BPM | TEMPERATURE: 98.2 F | BODY MASS INDEX: 29.83 KG/M2 | WEIGHT: 158 LBS | OXYGEN SATURATION: 98 % | DIASTOLIC BLOOD PRESSURE: 70 MMHG | HEIGHT: 61 IN

## 2025-08-11 DIAGNOSIS — M54.16 LUMBAR RADICULOPATHY: Primary | ICD-10-CM

## 2025-08-11 DIAGNOSIS — M79.605 LEFT LEG PAIN: ICD-10-CM

## 2025-08-11 DIAGNOSIS — K80.10 CALCULUS OF GALLBLADDER WITH CHOLECYSTITIS WITHOUT BILIARY OBSTRUCTION, UNSPECIFIED CHOLECYSTITIS ACUITY: ICD-10-CM

## 2025-08-11 DIAGNOSIS — K76.89 LIVER CYST: ICD-10-CM

## 2025-08-11 PROCEDURE — G8427 DOCREV CUR MEDS BY ELIG CLIN: HCPCS | Performed by: PHYSICAL MEDICINE & REHABILITATION

## 2025-08-11 PROCEDURE — G8419 CALC BMI OUT NRM PARAM NOF/U: HCPCS | Performed by: PHYSICAL MEDICINE & REHABILITATION

## 2025-08-11 PROCEDURE — 99244 OFF/OP CNSLTJ NEW/EST MOD 40: CPT | Performed by: PHYSICAL MEDICINE & REHABILITATION

## 2025-08-11 RX ORDER — LIDOCAINE 40 MG/G
CREAM TOPICAL
Qty: 45 G | Refills: 1 | Status: SHIPPED | OUTPATIENT
Start: 2025-08-11

## 2025-08-11 ASSESSMENT — ENCOUNTER SYMPTOMS
BACK PAIN: 1
NAUSEA: 0
SHORTNESS OF BREATH: 0
DIARRHEA: 0
CONSTIPATION: 0

## 2025-08-12 ENCOUNTER — TELEPHONE (OUTPATIENT)
Age: 50
End: 2025-08-12

## 2025-08-28 ENCOUNTER — HOSPITAL ENCOUNTER (OUTPATIENT)
Age: 50
Discharge: HOME | End: 2025-08-28
Attending: PHYSICAL MEDICINE & REHABILITATION
Payer: COMMERCIAL

## 2025-08-28 VITALS
HEART RATE: 59 BPM | OXYGEN SATURATION: 98 % | HEIGHT: 61 IN | WEIGHT: 155 LBS | DIASTOLIC BLOOD PRESSURE: 80 MMHG | SYSTOLIC BLOOD PRESSURE: 122 MMHG | BODY MASS INDEX: 29.27 KG/M2 | TEMPERATURE: 97.5 F

## 2025-08-28 DIAGNOSIS — R52 PAIN: ICD-10-CM

## 2025-08-28 DIAGNOSIS — M54.16 LUMBAR RADICULOPATHY: Primary | ICD-10-CM

## 2025-08-28 PROCEDURE — 64484 NJX AA&/STRD TFRM EPI L/S EA: CPT | Performed by: PHYSICAL MEDICINE & REHABILITATION

## 2025-08-28 PROCEDURE — 64483 NJX AA&/STRD TFRM EPI L/S 1: CPT | Performed by: PHYSICAL MEDICINE & REHABILITATION

## 2025-08-28 PROCEDURE — 2500000003 HC RX 250 WO HCPCS: Performed by: PHYSICAL MEDICINE & REHABILITATION

## 2025-08-28 PROCEDURE — 64483 NJX AA&/STRD TFRM EPI L/S 1: CPT

## 2025-08-28 PROCEDURE — 64484 NJX AA&/STRD TFRM EPI L/S EA: CPT

## 2025-08-28 PROCEDURE — 6360000004 HC RX CONTRAST MEDICATION: Performed by: PHYSICAL MEDICINE & REHABILITATION

## 2025-08-28 PROCEDURE — 6360000002 HC RX W HCPCS: Performed by: PHYSICAL MEDICINE & REHABILITATION

## 2025-08-28 RX ORDER — LIDOCAINE HYDROCHLORIDE 10 MG/ML
5 INJECTION, SOLUTION EPIDURAL; INFILTRATION; INTRACAUDAL; PERINEURAL ONCE
Status: COMPLETED | OUTPATIENT
Start: 2025-08-28 | End: 2025-08-28

## 2025-08-28 RX ORDER — SODIUM CHLORIDE 9 MG/ML
3 INJECTION, SOLUTION INTRAMUSCULAR; INTRAVENOUS; SUBCUTANEOUS ONCE
Status: COMPLETED | OUTPATIENT
Start: 2025-08-28 | End: 2025-08-28

## 2025-08-28 RX ORDER — INDOMETHACIN 25 MG/1
1 CAPSULE ORAL ONCE
Status: COMPLETED | OUTPATIENT
Start: 2025-08-28 | End: 2025-08-28

## 2025-08-28 RX ORDER — DEXAMETHASONE SODIUM PHOSPHATE 10 MG/ML
10 INJECTION, SOLUTION INTRAMUSCULAR; INTRAVENOUS ONCE
Status: COMPLETED | OUTPATIENT
Start: 2025-08-28 | End: 2025-08-28

## 2025-08-28 RX ADMIN — LIDOCAINE HYDROCHLORIDE 5 ML: 10 INJECTION, SOLUTION EPIDURAL; INFILTRATION; INTRACAUDAL; PERINEURAL at 08:43

## 2025-08-28 RX ADMIN — DEXAMETHASONE SODIUM PHOSPHATE 10 MG: 10 INJECTION, SOLUTION INTRAMUSCULAR; INTRAVENOUS at 08:42

## 2025-08-28 RX ADMIN — SODIUM BICARBONATE 1 MEQ: 84 INJECTION, SOLUTION INTRAVENOUS at 08:44

## 2025-08-28 RX ADMIN — SODIUM CHLORIDE 3 ML: 9 INJECTION INTRAMUSCULAR; INTRAVENOUS; SUBCUTANEOUS at 08:44

## 2025-08-28 RX ADMIN — IOHEXOL 2 ML: 300 INJECTION, SOLUTION INTRAVENOUS at 08:43

## 2025-08-28 ASSESSMENT — PAIN SCALES - GENERAL: PAINLEVEL_OUTOF10: 7

## 2025-09-03 ENCOUNTER — APPOINTMENT (OUTPATIENT)
Dept: PRIMARY CARE | Facility: CLINIC | Age: 50
End: 2025-09-03
Payer: COMMERCIAL

## 2025-09-26 ENCOUNTER — APPOINTMENT (OUTPATIENT)
Dept: PRIMARY CARE | Facility: CLINIC | Age: 50
End: 2025-09-26
Payer: COMMERCIAL